# Patient Record
Sex: FEMALE | Race: OTHER
[De-identification: names, ages, dates, MRNs, and addresses within clinical notes are randomized per-mention and may not be internally consistent; named-entity substitution may affect disease eponyms.]

---

## 2019-11-22 ENCOUNTER — HOSPITAL ENCOUNTER (EMERGENCY)
Dept: HOSPITAL 72 - EMR | Age: 29
Discharge: HOME | End: 2019-11-22
Payer: SELF-PAY

## 2019-11-22 VITALS — BODY MASS INDEX: 19.63 KG/M2 | WEIGHT: 100 LBS | HEIGHT: 60 IN

## 2019-11-22 VITALS — SYSTOLIC BLOOD PRESSURE: 98 MMHG | DIASTOLIC BLOOD PRESSURE: 61 MMHG

## 2019-11-22 VITALS — DIASTOLIC BLOOD PRESSURE: 61 MMHG | SYSTOLIC BLOOD PRESSURE: 98 MMHG

## 2019-11-22 DIAGNOSIS — F14.10: ICD-10-CM

## 2019-11-22 DIAGNOSIS — F15.10: ICD-10-CM

## 2019-11-22 DIAGNOSIS — K52.9: Primary | ICD-10-CM

## 2019-11-22 DIAGNOSIS — R10.9: ICD-10-CM

## 2019-11-22 LAB
ADD MANUAL DIFF: NO
ALBUMIN SERPL-MCNC: 2.7 G/DL (ref 3.4–5)
ALBUMIN/GLOB SERPL: 0.6 {RATIO} (ref 1–2.7)
ALP SERPL-CCNC: 519 U/L (ref 46–116)
ALT SERPL-CCNC: 122 U/L (ref 12–78)
ANION GAP SERPL CALC-SCNC: 13 MMOL/L (ref 5–15)
APTT BLD: 31 SEC (ref 23–33)
AST SERPL-CCNC: 49 U/L (ref 15–37)
BASOPHILS NFR BLD AUTO: 0.5 % (ref 0–2)
BILIRUB SERPL-MCNC: 0.5 MG/DL (ref 0.2–1)
BUN SERPL-MCNC: 14 MG/DL (ref 7–18)
CALCIUM SERPL-MCNC: 8.5 MG/DL (ref 8.5–10.1)
CHLORIDE SERPL-SCNC: 102 MMOL/L (ref 98–107)
CO2 SERPL-SCNC: 23 MMOL/L (ref 21–32)
CREAT SERPL-MCNC: 0.7 MG/DL (ref 0.55–1.3)
EOSINOPHIL NFR BLD AUTO: 0.3 % (ref 0–3)
ERYTHROCYTE [DISTWIDTH] IN BLOOD BY AUTOMATED COUNT: 11.1 % (ref 11.6–14.8)
GLOBULIN SER-MCNC: 4.2 G/DL
HCT VFR BLD CALC: 28.8 % (ref 37–47)
HGB BLD-MCNC: 9.8 G/DL (ref 12–16)
INR PPP: 0.9 (ref 0.9–1.1)
LYMPHOCYTES NFR BLD AUTO: 9.8 % (ref 20–45)
MCV RBC AUTO: 89 FL (ref 80–99)
MONOCYTES NFR BLD AUTO: 8.4 % (ref 1–10)
NEUTROPHILS NFR BLD AUTO: 81 % (ref 45–75)
PLATELET # BLD: 480 K/UL (ref 150–450)
POTASSIUM SERPL-SCNC: 3.4 MMOL/L (ref 3.5–5.1)
RBC # BLD AUTO: 3.25 M/UL (ref 4.2–5.4)
SODIUM SERPL-SCNC: 138 MMOL/L (ref 136–145)
WBC # BLD AUTO: 14.1 K/UL (ref 4.8–10.8)

## 2019-11-22 PROCEDURE — 86901 BLOOD TYPING SEROLOGIC RH(D): CPT

## 2019-11-22 PROCEDURE — 96361 HYDRATE IV INFUSION ADD-ON: CPT

## 2019-11-22 PROCEDURE — 86900 BLOOD TYPING SEROLOGIC ABO: CPT

## 2019-11-22 PROCEDURE — 36415 COLL VENOUS BLD VENIPUNCTURE: CPT

## 2019-11-22 PROCEDURE — 85730 THROMBOPLASTIN TIME PARTIAL: CPT

## 2019-11-22 PROCEDURE — 85610 PROTHROMBIN TIME: CPT

## 2019-11-22 PROCEDURE — 99284 EMERGENCY DEPT VISIT MOD MDM: CPT

## 2019-11-22 PROCEDURE — 96375 TX/PRO/DX INJ NEW DRUG ADDON: CPT

## 2019-11-22 PROCEDURE — 85025 COMPLETE CBC W/AUTO DIFF WBC: CPT

## 2019-11-22 PROCEDURE — 80053 COMPREHEN METABOLIC PANEL: CPT

## 2019-11-22 PROCEDURE — 96374 THER/PROPH/DIAG INJ IV PUSH: CPT

## 2019-11-22 PROCEDURE — 84484 ASSAY OF TROPONIN QUANT: CPT

## 2019-11-22 PROCEDURE — 81025 URINE PREGNANCY TEST: CPT

## 2019-11-22 PROCEDURE — 74177 CT ABD & PELVIS W/CONTRAST: CPT

## 2019-11-22 PROCEDURE — 80307 DRUG TEST PRSMV CHEM ANLYZR: CPT

## 2019-11-22 PROCEDURE — 83690 ASSAY OF LIPASE: CPT

## 2019-11-22 PROCEDURE — 93005 ELECTROCARDIOGRAM TRACING: CPT

## 2019-11-22 PROCEDURE — 86850 RBC ANTIBODY SCREEN: CPT

## 2019-11-22 NOTE — EMERGENCY ROOM REPORT
History of Present Illness


General


Chief Complaint:  Abdominal Pain


Source:  EMS





Present Illness


HPI


29-year-old female brought in by paramedics due to severe diffuse abdominal 

pain that started 2 days ago.  Patient rating pain 10 out of 10, guarding, 

denies diarrhea, nausea or vomiting.  Denies eating any food, alcohol, smoking, 

or drug use.  Denies blood in stool.  Appears to be very thin, does admit to no 

unintentional weight loss, however denies chest pain, shortness of breath, 

palpitation, headache and dizziness.  Patient also she reports that she has not 

been sexually active for many months.  Denies vaginal discharge, urinary 

frequency, urgency, vaginal ulcerations.  Patient appears to be having an 

underlying psychiatric disorder of unknown origin.  Last menstrual period was 2 

weeks ago and regular.  Has not taken medication for symptom relief.  Reports 

that she is very hungry and has not been eating in the past 2 days.  She 

reports foods and was explained that he needs to wait for CT results of her 

abdomen.


Allergies:  


Coded Allergies:  


     No Known Allergies (Unverified , 11/22/19)





Patient History


Past Medical History:  see triage record


Past Surgical History:  unable to obtain


Pertinent Family History:  none


Last Menstrual Period:  one week ago


Pregnant Now:  No


Immunizations:  UTD


Reviewed Nursing Documentation:  PMH: Agreed; PSxH: Agreed





Nursing Documentation-PMH


Past Medical History:  No Stated History





Review of Systems


All Other Systems:  negative except mentioned in HPI





Physical Exam





Vital Signs








  Date Time  Temp Pulse Resp B/P (MAP) Pulse Ox O2 Delivery O2 Flow Rate FiO2


 


11/22/19 17:09 99.1 110 19 105/63 (77) 96 Room Air  








Sp02 EP Interpretation:  reviewed, normal


General Appearance:  alert, GCS 15, non-toxic, mild distress, cachetic


Head:  normocephalic, atraumatic


Eyes:  bilateral eye normal inspection, bilateral eye PERRL


ENT:  hearing grossly normal, normal pharynx, no angioedema, normal voice


Neck:  full range of motion, supple, thyroid normal, supple/symm/no masses


Respiratory:  chest non-tender, lungs clear, normal breath sounds, no rhonchi, 

no respiratory distress, no wheezing, speaking full sentences


Cardiovascular #1:  regular rate, rhythm, no edema, no murmur, normal capillary 

refill


Cardiovascular #2:  2+ carotid (R), 2+ carotid (L), 2+ radial (R), 2+ radial (L)


Gastrointestinal:  no mass, no organomegaly, no peritonitis, no bruit, no hernia

, no pulsatile mass, no rebound, guarding - left Lower quadrant


Genitourinary:  no CVA tenderness


Musculoskeletal:  back normal, decreased range of motion, normal range of motion

, digits/nails normal


Neurologic:  alert, motor strength/tone normal, oriented x3, sensory intact, 

responsive, speech normal


Psychiatric:  normal inspection, judgement/insight normal


Skin:  no rash


Lymphatic:  no adenopathy





Medical Decision Making


PA Attestation


All my diagnosis and treatment plans were reviewed ad discussed with my 

supervising physician Dr. Alvarenga


Diagnostic Impression:  


 Primary Impression:  


 Gastroenteritis


 Additional Impressions:  


 Abdominal pain


 Cocaine abuse


 Methamphetamine abuse


ER Course


29-year-old female brought in by paramedics due to severe diffuse abdominal 

pain that started 2 days ago.  Patient rating pain 10 out of 10, guarding, 

denies diarrhea, nausea or vomiting.  Denies eating any food, alcohol, smoking, 

or drug use.  Denies blood in stool.  Appears to be very thin, does admit to no 

unintentional weight loss, however denies chest pain, shortness of breath, 

palpitation, headache and dizziness.  Patient also she reports that she has not 

been sexually active for many months.  Denies vaginal discharge, urinary 

frequency, urgency, vaginal ulcerations.  Patient appears to be having an 

underlying psychiatric disorder of unknown origin.  Last menstrual period was 2 

weeks ago and regular.  Has not taken medication for symptom relief.  Reports 

that she is very hungry and has not been eating in the past 2 days.  She 

reports foods and was explained that he needs to wait for CT results of her 

abdomen.





Ddx considered but are not limited to: appendicitis, cholecystis, gastritis, 

gastroenteritis, UTI, pyelonephritis, SBO, diverticulitis, influenza with GI 

manifestation, MI, complication with pregnancy 














Vital signs: are WNL, pt. is afebrile








 H&PE are most consistent with: Gastroenteritis, abdominal pain, cocaine and 

methamphetamine abuse














ORDERS: abdominal CT, abdominal pain set, EKG, omeprazole, Zofran














ED INTERVENTIONS: NS bolus, Toradol, Zofran, Pepcid




















DISCHARGE: At this time pt. is stable for d/c to home. Will provide printed 

patient care instructions, and any necessary prescriptions. Care plan and 

follow up instructions have been discussed with the patient prior to discharge.

  Patient to follow-up with a primary care provider, stop using cocaine and 

methamphetamine.  I gave her a list of STD clinics to go and get tested as 

patient refuses to do treatment.  Return to the emergency room if worsening 

symptoms.


EKG Diagnostic Results


Rate:  normal


Rhythm:  NSR


ST Segments:  no acute changes


Other Impression


No acute ST changes





CT/MRI/US Diagnostic Results


CT/MRI/US Diagnostic Results :  


   Imaging Test Ordered:  CT abdomen pelvis with contrast


   Impression


IMPRESSION:


1. Study somewhat limited due to lack of oral contrast, which limits evaluation 

of bowel structures.


2. Small bowel scattered fecal contents in the bowel loops with multifocal 

areas of nondistended bowel and scattered fluid filled small bowel loops could 

be incidental, could represent a manifestation of slow bowel transit, or could 

be seen with an infectious or inflammatory enteritis including Crohn's disease. 

Correlate clinically, and if there is further concern consider additional 

imaging as clinically directed such as MR Enterography on an outpatient basis.


3. Likely benign liver findings as above. If there is further clinical concern, 

consider outpatient MRI abdomen without and with intravenous contrast to 

further characterize.





Last Vital Signs








  Date Time  Temp Pulse Resp B/P (MAP) Pulse Ox O2 Delivery O2 Flow Rate FiO2


 


11/22/19 17:15  99 18   Room Air  


 


11/22/19 17:15 98.8   98/61 100   








Disposition:  HOME, SELF-CARE


Condition:  Stable


Scripts


Omeprazole (OMEPRAZOLE) 20 Mg Tablet.


20 MG ORAL DAILY, #20 TAB


   Prov: Cheryl Grayson         11/22/19 


Ondansetron (Zofran) 4 Mg Tablet


4 MG ORAL Q6H PRN for Nausea & Vomiting, #10 TAB


   Prov: Cheryl Grayson         11/22/19


Patient Instructions:  Abdominal Pain, Adult, Stimulant Use Disorder-Cocaine, 

Stimulant Use Disorder-Methamphetamines





Additional Instructions:  


Follow-up with your primary care provider, avoid using methamphetamine and 

cocaine.  If worsening symptoms return to emergency room.  At this time since 

you are not being symptomatic for sexually transmitted diseases and report that 

you are no sexually active I will give you the list of STD clinics that he can 

go into for further evaluation











Cheryl Grayson Nov 22, 2019 19:49

## 2019-11-22 NOTE — DIAGNOSTIC IMAGING REPORT
EXAM:

  CT Abdomen and Pelvis With Intravenous Contrast

 

CLINICAL HISTORY:

  ABD PAIN

 

TECHNIQUE:

  Axial computed tomography images of the abdomen and pelvis with 

intravenous contrast.  CTDI is 7.4 mGy and DLP is 409 mGy-cm.  One or 

more of the following dose reduction techniques were used: automated 

exposure control, adjustment of the mA and/or kV according to patient 

size, use of iterative reconstruction technique.

  Coronal and sagittal reformatted images were created and reviewed.

 

COMPARISON:

  No relevant prior studies available.

 

FINDINGS:

  Lung bases:  Unremarkable.  No mass.  No consolidation.

 

 ABDOMEN:

  Liver:  Ill-defined small wedge-shaped approximately 1 cm hepatic 

hypodensities in the anterior portions of segment V and VI are likely due 

to vascular shunting or other benign cause.  Normal evaluated on single 

phase study.  Falciform ligament region focal fatty infiltration of the 

liver.  Otherwise unremarkable liver.

  Gallbladder and bile ducts:  Unremarkable.  No calcified stones.  No 

ductal dilation.

  Pancreas:  Unremarkable.  No mass.  No ductal dilation.

  Spleen:  Unremarkable.  No splenomegaly.

  Adrenals:  Unremarkable.  No mass.

  Kidneys and ureters:  Unremarkable.  No solid mass.  No hydronephrosis.

  Stomach and bowel:  Scattered small bowel fecal contents most notably 

in the left posterior abdomen axial images 40-55 and coronal images 17-26 

could be a manifestation of slow intestinal transit, and could be a cause 

for pain.  Distal to this location on axial images 53-55 is a 

decompressed segment, which could simply represent or stenosis.  However, 

the proper clinical context, this could represent bowel wall thickening 

such as with chronic inflammatory bowel disease.  Additional scattered 

areas of small bowel containing liquid contents could be incidental or 

could be seen with an infectious or inflammatory enteritis.  Study 

somewhat limited due to lack of oral contrast, which limits evaluation of 

bowel structures.  No obstruction.

 

 PELVIS:

  Appendix:  No findings to suggest acute appendicitis.

  Bladder:  Unremarkable.  No mass.

  Reproductive:  Benign functional right ovarian follicle.  3.2 cm.

 

 ABDOMEN and PELVIS:

  Intraperitoneal space:  Unremarkable.  No free air.  No significant 

fluid collection.

  Bones/joints:  See above.

  Soft tissues:  Unremarkable.

  Vasculature:  See above.

  Lymph nodes:  Unremarkable.  No enlarged lymph nodes.

 

IMPRESSION:     

1.  Study somewhat limited due to lack of oral contrast, which limits 

evaluation of bowel structures.

2.  Small bowel scattered fecal contents in the bowel loops with 

multifocal areas of nondistended bowel and scattered fluid filled small 

bowel loops could be incidental, could represent a manifestation of slow 

bowel transit, or could be seen with an infectious or inflammatory 

enteritis including Crohn's disease.  Correlate clinically, and if there 

is further concern consider additional imaging as clinically directed 

such as MR Enterography on an outpatient basis.

3.  Likely benign liver findings as above.  If there is further clinical 

concern, consider outpatient MRI abdomen without and with intravenous 

contrast to further characterize.

## 2019-11-22 NOTE — NUR
ED Nurse Note:

Pt is aaox4, vss, no acute distress. Pt states pain is 9/10. Brought in by 
[facility] from [facility] due to LLQ abdominal pain x 3 day; reports no fever, 
chills or N/V.  Pt abdomin is normal and non tender. No skin issues noted.